# Patient Record
Sex: FEMALE | URBAN - METROPOLITAN AREA
[De-identification: names, ages, dates, MRNs, and addresses within clinical notes are randomized per-mention and may not be internally consistent; named-entity substitution may affect disease eponyms.]

---

## 2022-01-05 ENCOUNTER — HOSPITAL ENCOUNTER (OUTPATIENT)
Dept: PHYSICAL THERAPY | Age: 32
Discharge: HOME | End: 2022-01-05
Payer: COMMERCIAL

## 2022-01-05 PROCEDURE — 97161 PT EVAL LOW COMPLEX 20 MIN: CPT

## 2022-01-05 PROCEDURE — 97112 NEUROMUSCULAR REEDUCATION: CPT

## 2022-01-05 PROCEDURE — 97530 THERAPEUTIC ACTIVITIES: CPT

## 2022-01-05 NOTE — PROGRESS NOTES
[facility] [employer] [facility]  [address] [place] [address]  [phone] fax    Plan of Care/ Statement of Necessity for Physical Therapy Services    Patient name: Alejandra Contreras Start of Care: 2022   Referral source: Toña Jackson MD : 1990    Medical Diagnosis: Pelvic floor dysfunction [M62.89]  Payor: Laura Naylor / Plan: Ela Irving / Product Type: Commerical /  Onset Date:21    Treatment Diagnosis: PFD, bowel urgency, diastasis recti   Prior Hospitalization: see medical history Provider#: [id number]   Medications: Verified on Patient summary List    Comorbidities: asthma, 4th degree tear with childbirth   Prior Level of Function: ind with all mobility & house chores, horseback riding           The Plan of Care and following information is based on the information from the initial evaluation. Assessment/ mcgill information: Ms. Alejandra Contreras is a 29 y/o, F who present with c/o PFD, PF muscles weakness, bowel urgency and diastasis recti. Pt is 6 weeks post partum; first childbirth with 4th degree tear. Pt reports mod pressure/soreness with pelvic and lower abdominal region after prolonged activities, lifting, and walking. Pain is worst with Left side. She has significant difficulty delaying bowel urgency. Pt notes some difficulty with relaxation, min pressure and tensing of PF muscles during BMs. She denies any bowel leakage nor bladder problems. Pt hasn't been sexually active since childbirth, noted mod soreness with last internal assessment with OBGYN.  Evaluation reveals 3.5 finger diastasis recti, elevated tone of PF muscles, and mod-max pressure/soreness with palpation, worst with Left side. PERF score is 1/5/2//6 indicating fair strength and endurance of PF muscles. Also note mild instability of ant wall and min decreased mobility of perineal body.  Patient may benefit from physical therapy to address these limitations to improve her QOL.    Evaluation Complexity History MEDIUM  Complexity : 1-2 comorbidities / personal factors will impact the outcome/ POC ; Examination LOW Complexity : 1-2 Standardized tests and measures addressing body structure, function, activity limitation and / or participation in recreation  ;Presentation LOW Complexity : Stable, uncomplicated  ;Clinical Decision Making MEDIUM Complexity : FOTO score of 26-74  Overall Complexity Rating: LOW     Problem List: Pelvic pain/dysfunction, Decreased pelvic floor mm awareness, Decreased pelvic floor mm strength, Use of accessory muscles, Improper voiding habits and Hypertonus of pelvic floor    Treatment Plan may include any combination of the following:   Therapeutic exercise, Urge suppression techniques, Neuromuscular re-education, Manual therapy, Physical agent/modality and Patient education  Patient / Family readiness to learn indicated by: asking questions, trying to perform skills and interest    Persons(s) to be included in education: patient (P)    Barriers to Learning/Limitations: None    Patient Goal (s): strengthen pelvic floor and better bowel control    Patient Self Reported Health Status: excellent    Rehabilitation Potential: good    Short Term Goals: To be accomplished in 4  weeks:  1. Patient will demonstrate home exercise program accurately as adjunct to PT clinic visits to promote healthy lifestyle and increase quality of life. Status @ Eval: good understanding    2. Patient will report ability to utilize therawand with no or min pain to promote decrease of symptoms and increase quality of life. Status @ Eval: good understanding    3. Patient will report at least 25% improvement with pressure, pain and bowel urgency for more normal function and increased quality of life. Status @ Eval:1- 8/10, worst with prolonged standing/walking, activities, lifting, Left > right, mod-max difficulty with delaying fecal urgency     Long Term Goals:  To be accomplished in 8 weeks:  1. Patient will have FOTO score for PFDI Pain decreased by 5-8% points indicating improvement in function and report of no difficulty with maintaining intimate relations due to pain. Status @ Eval: 50    2. Patient will improve PF muscles strength at least 1 grade for increased quality of life and more normal function. Status @ Eval: 1/5    3. Patient will report at least 60% improvement with pressure, pain and bowel urgency for more normal function and increased quality of life. Status @ Eval: 1- 8/10, worst with prolonged standing/walking, activities, lifting, Left > right, mod-max difficulty with delaying fecal urgency    4. Patient will be independent with HEP at time of discharge to be able to continue with pelvic floor program.  Status @ Eval: good understanding     Frequency / Duration: Patient to be seen 1-2 times per week for 6 weeks. Patient/ Caregiver education and instruction: Diagnosis, prognosis, Proper Voiding Habits, Diet, Pain Management, Exercises and Bladder Retraining      Judith Goins Ruben 1/5/2022 9:31 AM    ________________________________________________________________________    I certify that the above Therapy Services are being furnished while the patient is under my care. I agree with the treatment plan and certify that this therapy is necessary.     [de-identified] Signature:____________Date:_________TIME:________     Yoon Washburn MD  ** Signature, Date and Time must be completed for valid certification **      Please sign and return to [facility] - [facility] [employer]   [address] [place] [address]  [phone] fax

## 2022-01-05 NOTE — PROGRESS NOTES
PF Daily Treatment Note  Patient Name: Shantal Trujillo  Date:2022  []  Patient  Verified  Insurance:Payor: Javy Mccoy / Plan: Guerline Morrow / Product Type: Commerical /   In time: 9:45  Out time:10:43  Total Treatment Time (min): 58  Total Timed Codes (min): 40  1:1 Treatment Time ( only): 62   Visit #: 1 of -    Treatment Area: [x] Pelvic Floor     [] Other:  SUBJECTIVE  Pain Level (0-10 scale): 1/10  Any medication changes, allergies to medications, adverse drug reactions, diagnosis change, or new procedure performed?: [x] No    [] Yes (see summary sheet for update)     Ms. Shantal Trujillo is a 27 y/o, F who present with c/o PFD, PF muscles weakness, bowel urgency and diastasis recti. Pt is 6 weeks post partum; first childbirth with 4th degree tear. Pt reports mod pressure/soreness with pelvic and lower abdominal region after prolonged activities, lifting, and walking. Pain is worst with Left side. She has significant difficulty delaying bowel urgency. Pt notes some difficulty with relaxation, min pressure and tensing of PF muscles during BMs. She denies any bowel leakage nor bladder problems. Pt hasn't been sexually active since childbirth, noted mod soreness with last internal assessment with OBGYN. Pelvic Floor Dysfunction Evaluation    Diastasis recti:    At umbilicus: 3.5 finger width   1cm sup to umbilicus: 3 finger width   1 cm inf to umbilicus: 1 finger width    Musculoskeletal Screen:    Skin Integrity:  [] Healthy [x] Red  [] Labia Atrophy [] Fragile    Sensation: [x] Intact [] Diminished:    Muscle Bulk: [x] Symmetrical  [] Well-developed [] Atrophied:  []L   []R   []B    Prolapse: [] Cystocele:   [] Rectocele:    PERF Score (Performance/Endurance/Repetitions/Flicks)   P: 1 E: 5 R: 2 F: 6 Total:    Patient has failed previous pelvic floor muscle training?   [] Yes    [] No    EMG Evaluation:  [] N/A [] Deferred secondary to:    Channel A: Electrode type:  [] Internal    [] Surface    [] Vaginal [] Rectal  Channel B: Electrode location:    Baseline Resting Tone (1 minute)  Channel A (microvolts): Quality:  [] Normal [] Irradic [] Elevated  Channel B (microvolts): Slow Twitch: (10 second hold, 20 second rest)  Channel A (microvolts): Quality:[] Quick/slow rise [] Low net rise  Net rise (microvolts):   [] Slow Relaxation [] Incoordination       [] Unable to contract [] Fatigues at (sec):       [] Elevated baseline between contractions    Channel B (microvolts): Use of Accessory Muscles: [] Minimal  Net rise (microvolts):   [] Moderate  [] Excessive       [] No use of accessory muscles    Fast Twitch (3 second hold, 10 second rest)  Channel A (microvolts): Quality:[] Quick/slow rise [] Low net rise  Net rise (microvolts):   [] Slow Relaxation [] Incoordination       [] Unable to contract [] Fatigues at (sec):       [] Elevated baseline between contractions    Channel B (microvolts): Use of Accessory Muscles: [] Minimal  Net rise (microvolts):   [] Moderate  [] Excessive       [] No use of accessory muscles    Optional Tests:  Lower abdominal strength: /5    Comments/Additional Tests:      OBJECTIVE  18 min eval    30 min Therapeutic Activity:  -  See flow sheet :Pt edu within scope of practice on prognosis, POC, PF muscles anatomy, modalities use, gentle mobilization and scar massage for perineal bodypositioning, healthy bowel and bladder function, diastasis recti management . Rationale: Increase pelvic floor muscle strength, Improve quality of pelvic floor contractions, Decrease resting tone of the pelvic floor, Increase tissue extensibility of the pelvic floor muscles, Increase core strength, Inhibit abnormal muscle activity and Improve lumbosacral and coccygeal mobility in order to Decrease bowel urgency, Improve frequency and ease of bowel movements, Improve ability to engage in sexual intercourse, Improve ability to undergo a gynecological exam and Improve ability to perform ADLs.        10 min Neuromuscular Re-education:  []  See flow sheet : quality PF muscles and core contraction   Rationale: Increase pelvic floor muscle strength, Improve quality of pelvic floor contractions, Decrease resting tone of the pelvic floor, Increase tissue extensibility of the pelvic floor muscles, Increase core strength, Inhibit abnormal muscle activity and Improve lumbosacral and coccygeal mobility in order to Decrease bowel urgency, Improve frequency and ease of bowel movements, Improve ability to engage in sexual intercourse, Improve ability to undergo a gynecological exam and Improve ability to perform ADLs.        min Patient Education: [x] Review HEP    [] Progressed/Changed HEP based on:   [] positioning   [] body mechanics   [] transfers   [] heat/ice application        Other Objective/Functional Measures:   []baseline resting tone:   []slow twitch mms   []fast twitch mms    Pain Level (0-10 scale) post treatment: 0/10    ASSESSMENT/Changes in Function: see POC please    []  Decrease # of leaks   [] No change []  Improving [] Resolved     []  Decrease hypertonus [] No change []  Improving [] Resolved     []  Increase void interval [] No change []  Improving [] Resolved     []  Increase PF strength [] No change []  Improving [] Resolved     []  Increase PF endurance [] No change []  Improving [] Resolved     []  Increase endurance [] No change []  Improving [] Resolved     []  Decrease # of pads [] No change []  Improving [] Resolved     []  Decrease pain [] No change []  Improving [] Resolved       Patient will continue to benefit from skilled PT services to modify and progress therapeutic interventions, address functional mobility deficits, address ROM deficits, address strength deficits, analyze and address soft tissue restrictions, analyze and cue movement patterns, analyze and modify body mechanics/ergonomics, assess and modify postural abnormalities and instruct in home and community integration to attain remaining goals.      [x]  See Plan of Care         PLAN  [x]  Upgrade activities as tolerated     [x]  Continue plan of care  []  Update interventions per flow sheet       []  Discharge due to:_  []  Other:_      Gerhardt Sawyer 1/5/2022  9:30 AM

## 2022-01-12 ENCOUNTER — HOSPITAL ENCOUNTER (OUTPATIENT)
Dept: PHYSICAL THERAPY | Age: 32
End: 2022-01-12
Payer: COMMERCIAL

## 2022-01-19 ENCOUNTER — APPOINTMENT (OUTPATIENT)
Dept: PHYSICAL THERAPY | Age: 32
End: 2022-01-19
Payer: COMMERCIAL

## 2022-01-26 ENCOUNTER — HOSPITAL ENCOUNTER (OUTPATIENT)
Dept: PHYSICAL THERAPY | Age: 32
Discharge: HOME | End: 2022-01-26
Payer: COMMERCIAL

## 2022-01-26 PROCEDURE — 97530 THERAPEUTIC ACTIVITIES: CPT

## 2022-01-26 PROCEDURE — 97112 NEUROMUSCULAR REEDUCATION: CPT

## 2022-01-26 NOTE — PROGRESS NOTES
PF DAILY TREATMENT NOTE 3-16    Patient Name: Mara Hernandez  Date:2022  : 1990  [x]  Patient  Verified  Payor: Brandon Rodriguez / Plan: Jayla  / Product Type: Commerical /    In time: 2:15   Out time:3:14  Total Treatment Time (min): 61  Visit #: 2 of 16    Treatment Area: [x] Pelvic Floor     [] Other:    SUBJECTIVE  Pain Level (0-10 scale): 0/10  Any medication changes, allergies to medications, adverse drug reactions, diagnosis change, or new procedure performed?: [x] No    [] Yes (see summary sheet for update)  Subjective functional status/changes:   [] No changes reported  Pt reports having a lot of pressure/prolapse with coughing/sneezing. She has no problem with exercises. She notes a lot of tightness but not as painful with sexual relations. Pt reports min bleeding with BM, she can feel her perineal body stays really tight. She has some soreness with the labia and 1st layers of PFM. No referring pain of PFM to abdominal region; her Left abdominal region still hurts with crunching activities, bending over activities and lifting. OBJECTIVE    30 min Therapeutic Activity:  []  See flow sheet :    []  Increase Tissue extensibility        [x]  Assess fiber intake    [x]  Assess voiding habits  [x]  Assess bowel habits  [x]  Other: therawand use, core HEP progression, answered all questions on manual techniques   Rationale: Increase pelvic floor muscle strength, Improve quality of pelvic floor contractions, Decrease resting tone of the pelvic floor, Increase tissue extensibility of the pelvic floor muscles, Increase core strength, Inhibit abnormal muscle activity and Improve lumbosacral and coccygeal mobility in order to Decrease bowel urgency, Improve ability to engage in sexual intercourse and Improve ability to perform ADLs.       29 min Neuromuscular Re-education:  []  See flow sheet :   [x]  Pelvic floor strengthening                 []  Pelvic floor downtraining  [x]  Quality pelvic floor contractions       [x]  Relaxation techniques  []  Urge suppression exercises  [x]  Other: appropriate voiding  Rationale: Increase pelvic floor muscle strength, Improve quality of pelvic floor contractions, Decrease resting tone of the pelvic floor, Increase tissue extensibility of the pelvic floor muscles, Increase core strength, Inhibit abnormal muscle activity and Improve lumbosacral and coccygeal mobility in order to Decrease bowel urgency, Improve ability to engage in sexual intercourse and Improve ability to perform ADLs. With   [] TE   [] TA   [] neuro  [] manual   [] other: Patient Education: [x] Review HEP    [] Progressed/Changed HEP based on:   [] positioning   [] body mechanics   [] transfers   [] heat/ice application    [] other:      Other Objective/Functional Measures:   []baseline resting tone: 6.5 µV  []slow twitch mms 10 sec hold, 10 sec rest, 7 rep: work: 24.4 µV, rest: 8.4 µV  []fast twitch mms   Good coordination with stretching/relaxation, baseline resting tone improves to 4.5 µV at the end of PT session    Pain Level (0-10 scale) post treatment: 0/10    ASSESSMENT/Changes in Function: Pt present with elevated tone but good improvement of tone and coordination for relaxation/gentle bulging after education. She reports good compliance with HEP and therawand use. Will progress strengthening therex for core and deep hips as tolerated; review down training and manual for PFM next visit.      []  Decrease # of leaks   [] No change []  Improving [] Resolved     []  Decrease hypertonus [] No change []  Improving [] Resolved     []  Increase void interval [] No change []  Improving [] Resolved     []  Increase PF strength [] No change []  Improving [] Resolved     []  Increase PF endurance [] No change []  Improving [] Resolved     []  Increase endurance [] No change []  Improving [] Resolved     []  Decrease # of pads [] No change []  Improving [] Resolved     []  Decrease pain [] No change []  Improving [] Resolved     []  Increased coordination [] No change []  Improving [] Resolved     []  Increased Bowel Frequency [] No change []  Improving [] Resolved       Patient will continue to benefit from skilled PT services to modify and progress therapeutic interventions, address functional mobility deficits, address ROM deficits, address strength deficits, analyze and address soft tissue restrictions, analyze and cue movement patterns, analyze and modify body mechanics/ergonomics, assess and modify postural abnormalities, address imbalance/dizziness and instruct in home and community integration to attain remaining goals. [x]  See Plan of Care  []  See progress note/recertification  []  See Discharge Summary         Progress towards goals / Updated goals:  Short Term Goals: To be accomplished in 4  weeks:  1. Patient will demonstrate home exercise program accurately as adjunct to PT clinic visits to promote healthy lifestyle and increase quality of life. Status @ Eval: good understanding  Current: good compliance 1-26-22     2. Patient will report ability to utilize therawand with no or min pain to promote decrease of symptoms and increase quality of life. Status @ Eval: good understanding  Current: good compliance 1-26-22     3. Patient will report at least 25% improvement with pressure, pain and bowel urgency for more normal function and increased quality of life. Status @ Eval:1- 8/10, worst with prolonged standing/walking, activities, lifting, Left > right, mod-max difficulty with delaying fecal urgency     Long Term Goals: To be accomplished in 8 weeks:  1. Patient will have FOTO score for PFDI Pain decreased by 5-8% points indicating improvement in function and report of no difficulty with maintaining intimate relations due to pain. Status @ Eval: 50     2. Patient will improve PF muscles strength at least 1 grade for increased quality of life and more normal function.    Status @ Eval: 1/5     3. Patient will report at least 60% improvement with pressure, pain and bowel urgency for more normal function and increased quality of life. Status @ Eval: 1- 8/10, worst with prolonged standing/walking, activities, lifting, Left > right, mod-max difficulty with delaying fecal urgency     4.  Patient will be independent with HEP at time of discharge to be able to continue with pelvic floor program.  Status @ Eval: good understanding     PLAN  [x]  Upgrade activities as tolerated     [x]  Continue plan of care  []  Update interventions per flow sheet       []  Discharge due to:_  []  Other:_      Stevenson Cantrell 1/26/2022  8:22 AM    Future Appointments   Date Time Provider Maura Larios   1/26/2022  2:15 PM Demarcus Leach [id number] [facility]   2/2/2022  2:15 PM Demarcus Leach [id number] [facility]   2/9/2022  2:15 PM Demarcus Leach [id number] [facility]

## 2022-02-02 ENCOUNTER — HOSPITAL ENCOUNTER (OUTPATIENT)
Dept: PHYSICAL THERAPY | Age: 32
Discharge: HOME | End: 2022-02-02
Payer: COMMERCIAL

## 2022-02-02 PROCEDURE — 97112 NEUROMUSCULAR REEDUCATION: CPT

## 2022-02-02 PROCEDURE — 97140 MANUAL THERAPY 1/> REGIONS: CPT

## 2022-02-02 PROCEDURE — 97110 THERAPEUTIC EXERCISES: CPT

## 2022-02-02 NOTE — PROGRESS NOTES
[facility] [employer] [facility]  [address] [place] [address]  [phone] fax    Pelvic Floor Progress Note    Patient name: Dwayne Benitez Start of Care: 2022   Referral source: Alissa Miller MD : 1990               Medical Diagnosis: Pelvic floor dysfunction [M62.89]  Payor: Tomas Davis / Plan: Candis Haywood / Product Type: Commerical /  Onset Date:21               Treatment Diagnosis: PFD, bowel urgency, diastasis recti   Prior Hospitalization: see medical history Provider#: [id number]   Medications: Verified on Patient summary List    Comorbidities: asthma, 4th degree tear with childbirth   Prior Level of Function: ind with all mobility & house chores, horseback riding                                                                               Visits from Start of Care: 3    Missed Visits: 0    Established Goals:           Excellent Good         Limited           None  [x] Increase Pelvic MM strength []  []  [x]  []  [x] Decrease Pelvic MM hypertonus []  []  [x]  []  [] Decrease Incontinence Episodes []  []  []  []   [] Improve Voiding Habits  []  []  []  []  [x] Decreased Urgency   []  [x]  []  []    Key Functional Changes: improving pain/pressure with sexual relations, improving bowel urgency sensation, improving core strength    Updated Goals: to be achieved in 4 weeks:  1. Patient will have FOTO score for PFDI Pain decreased by 5-8% points indicating improvement in function and report of no difficulty with maintaining intimate relations due to pain. Status at Progress Note: 17     2. Patient will improve PF muscles strength at least 1 grade for increased quality of life and more normal function. Status at Progress Note: ; progressing slowly 2-2-     3. Patient will report at least 60% improvement with pressure, pain and bowel urgency for more normal function and increased quality of life.   Status at Progress Note[de-identified] 1- 8/10, worst with prolonged standing/walking, activities, lifting, Left > right, mod-max difficulty with delaying fecal urgency;progressing well 50% improvement with pain/pressure 2-2-22     4. Patient will be independent with HEP at time of discharge to be able to continue with pelvic floor program.  Status at Progress Note: good compliance 2-2-22    ASSESSMENT/RECOMMENDATIONS: Pt's making good progress with PT, reports 50% improvement with overall pain/pressure, no pain with sexual relations and bowel function is WNL. Diastasis recti improved gradually, 2.75 finger width at umbilicus). She present with hips flexor tightness, significant elevation of PFM's tone thus limiting strength and tenderness along Left abdominal region. Pt would cont to benefit from skilled PF PT to Increase pelvic floor muscle strength, Improve quality of pelvic floor contractions, Decrease resting tone of the pelvic floor, Increase tissue extensibility of the pelvic floor muscles, Increase core strength, Inhibit abnormal muscle activity and Improve lumbosacral and coccygeal mobility in order to Improve ability to engage in sexual intercourse, Improve ability to undergo a gynecological exam and Improve ability to perform ADLs.        [x]Continue therapy per initial plan/protocol at a frequency of  1-2 x per week for 4 weeks  []Continue therapy with the following recommended changes:_____________________      _____________________________________________________________________  []Discontinue therapy progressing towards or have reached established goals  []Discontinue therapy due to lack of appreciable progress towards goals  []Discontinue therapy due to lack of attendance or compliance  []Await Physician's recommendations/decisions regarding therapy  []Other:________________________________________________________________    Thank you for this referral.   Fransisco Cummings 2/2/2022 2:35 PM  NOTE TO PHYSICIAN:  PLEASE COMPLETE THE ORDERS BELOW AND   FAX TO [facility]: [phone]  If you are unable to process this request in 24 hours please contact our office: [phone]    ? I have read the above report and request that my patient continue as recommended. ? I have read the above report and request that my patient continue therapy with the following changes/special instructions:___________________________________________________________  ? I have read the above report and request that my patient be discharged from therapy.     Physician's Signature:____________Date:_________TIME:________     Yessy Rose MD  ** Signature, Date and Time must be completed for valid certification **

## 2022-02-02 NOTE — PROGRESS NOTES
PF DAILY TREATMENT NOTE 3-16    Patient Name: Katya Dimas  Date:2022  : 1990  [x]  Patient  Verified  Payor: Genet Castillo / Plan: Delta Cornea / Product Type: Commerical /    In time:2:17  Out time: 3:01  Total Treatment Time (min): 44  Visit #: 3 of 16    Treatment Area: [x] Pelvic Floor     [] Other:    SUBJECTIVE  Pain Level (0-10 scale): 0/10  Any medication changes, allergies to medications, adverse drug reactions, diagnosis change, or new procedure performed?: [x] No    [] Yes (see summary sheet for update)  Subjective functional status/changes:   [] No changes reported  Pt cont to note min pressure/tension of Left abdominal muscles; bowel is WNL. OBJECTIVE      10 min Therapeutic Exercise:  [x] See flow sheet :   []  Pelvic floor strengthening                 [x]  Pelvic floor downtraining  []  Quality pelvic floor contractions       [x]  Relaxation techniques  []  Urge suppression exercises  []  Other:  Rationale: Increase pelvic floor muscle strength, Improve quality of pelvic floor contractions, Decrease resting tone of the pelvic floor, Increase tissue extensibility of the pelvic floor muscles, Increase core strength, Inhibit abnormal muscle activity and Improve lumbosacral and coccygeal mobility in order to Improve ability to engage in sexual intercourse, Improve ability to undergo a gynecological exam and Improve ability to perform ADLs.        24 min Neuromuscular Re-education:  [x]  See flow sheet :   []  Pelvic floor strengthening                 [x]  Pelvic floor downtraining  []  Quality pelvic floor contractions       [x]  Relaxation techniques  []  Urge suppression exercises  [x]  Other: core & hips strengthening  Rationale:  Increase pelvic floor muscle strength, Improve quality of pelvic floor contractions, Decrease resting tone of the pelvic floor, Increase tissue extensibility of the pelvic floor muscles, Increase core strength, Inhibit abnormal muscle activity and Improve lumbosacral and coccygeal mobility in order to Improve ability to engage in sexual intercourse, Improve ability to undergo a gynecological exam and Improve ability to perform ADLs. 10 min Manual Therapy:  Abdominal MFR, skin roll for rectus abdominus muscles   The manual therapy interventions were performed at a separate and distinct time from the therapeutic activities interventions. Rationale: Decrease resting tone of the pelvic floor, Increase tissue extensibility of the pelvic floor muscles, Inhibit abnormal muscle activity and Improve lumbosacral and coccygeal mobility in order to Decrease bowel urgency, Improve ability to engage in sexual intercourse and Improve ability to perform ADLs.           With   [] TE   [] TA   [] neuro  [] manual   [] other: Patient Education: [x] Review HEP    [] Progressed/Changed HEP based on:   [] positioning   [] body mechanics   [] transfers   [] heat/ice application    [] other:      Other Objective/Functional Measures:   []baseline resting tone:   []slow twitch mms   []fast twitch mms    Pain Level (0-10 scale) post treatment: 0/10    ASSESSMENT/Changes in Function: see Progress Note please      []  Decrease # of leaks   [] No change []  Improving [] Resolved     []  Decrease hypertonus [] No change []  Improving [] Resolved     []  Increase void interval [] No change []  Improving [] Resolved     []  Increase PF strength [] No change []  Improving [] Resolved     []  Increase PF endurance [] No change []  Improving [] Resolved     []  Increase endurance [] No change []  Improving [] Resolved     []  Decrease # of pads [] No change []  Improving [] Resolved     []  Decrease pain [] No change []  Improving [] Resolved     []  Increased coordination [] No change []  Improving [] Resolved     []  Increased Bowel Frequency [] No change []  Improving [] Resolved       Patient will continue to benefit from skilled PT services to modify and progress therapeutic interventions, address functional mobility deficits, address ROM deficits, address strength deficits, analyze and address soft tissue restrictions, analyze and cue movement patterns, analyze and modify body mechanics/ergonomics, assess and modify postural abnormalities and instruct in home and community integration to attain remaining goals. [x]  See Plan of Care  []  See progress note/recertification  []  See Discharge Summary         Progress towards goals / Updated goals:  Short Term Goals: To be accomplished in 4  weeks:  1. Patient will demonstrate home exercise program accurately as adjunct to PT clinic visits to promote healthy lifestyle and increase quality of life. Status @ Eval: good understanding  Current: good compliance 1-26-22     2. Patient will report ability to utilize therawand with no or min pain to promote decrease of symptoms and increase quality of life. Status @ Eval: good understanding  Current: good compliance 1-26-22     3. Patient will report at least 25% improvement with pressure, pain and bowel urgency for more normal function and increased quality of life. Status @ Eval:1- 8/10, worst with prolonged standing/walking, activities, lifting, Left > right, mod-max difficulty with delaying fecal urgency  Current: met 50% improvement 2-2-22     Long Term Goals: To be accomplished in 8 weeks:  1. Patient will have FOTO score for PFDI Pain decreased by 5-8% points indicating improvement in function and report of no difficulty with maintaining intimate relations due to pain. Status @ Eval: 50  Current: met improved 66% 2-2-22     2. Patient will improve PF muscles strength at least 1 grade for increased quality of life and more normal function. Status @ Eval: 1/5  Current: progressing slowly 2-2-22     3. Patient will report at least 60% improvement with pressure, pain and bowel urgency for more normal function and increased quality of life.   Status @ Eval: 1- 8/10, worst with prolonged standing/walking, activities, lifting, Left > right, mod-max difficulty with delaying fecal urgency  Current: progressing well 50% improvement with pain/pressure 2-2-22     4.  Patient will be independent with HEP at time of discharge to be able to continue with pelvic floor program.  Status @ Eval: good understanding   Current: good compliance 2-2-22    PLAN  [x]  Upgrade activities as tolerated     [x]  Continue plan of care  []  Update interventions per flow sheet       []  Discharge due to:_  []  Other:_      Kedar Figueroa 2/2/2022  8:11 AM    Future Appointments   Date Time Provider Maura Larios   2/2/2022  2:15 PM Radha Mccray [id number] [facility]   2/9/2022  2:15 PM Radha Mccray [id number] [facility]

## 2022-02-09 ENCOUNTER — HOSPITAL ENCOUNTER (OUTPATIENT)
Dept: PHYSICAL THERAPY | Age: 32
End: 2022-02-09
Payer: COMMERCIAL

## 2022-02-22 NOTE — PROGRESS NOTES
[facility] - [facility]  [address]  [phone] fax    Physical Therapy Discharge Summary       Patient name: Isidra [facility] - INPATIENT of Care: 1/5/2022   Referral source: Edison Stevens MD PTI: [id number]               Medical Diagnosis: Pelvic floor dysfunction [M62.89]  Payor: Antonio Thomson / Plan: Cristina Salgado / Product Type: Commerical /  Onset Date:11-21-21               Treatment Diagnosis: PFD, bowel urgency, diastasis recti   Prior Hospitalization: see medical history Provider#: [id number]   Medications: Verified on Patient summary List    Comorbidities: asthma, 4th degree tear with childbirth   Prior Level of Function: ind with all mobility & house chores, horseback riding                                                                                Visits from Start of Care: 3                                      Missed Visits: 0     Reporting Period : 2-2-2022    Summary of Care:  Updated Goals: to be achieved in 4 weeks:  1. Patient will have FOTO score for PFDI Pain decreased by 5-8% points indicating improvement in function and report of no difficulty with maintaining intimate relations due to pain. Status at Progress Note: 17  Status at discharge: progressing well      2. Patient will improve PF muscles strength at least 1 grade for increased quality of life and more normal function. Status at Progress Note: 1/5; progressing slowly 2-2-22  Status at discharge: met     3. Patient will report at least 60% improvement with pressure, pain and bowel urgency for more normal function and increased quality of life. Status at Progress Note[de-identified] 1- 8/10, worst with prolonged standing/walking, activities, lifting, Left > right, mod-max difficulty with delaying fecal urgency;progressing well 50% improvement with pain/pressure 2-2-22  Status at discharge: met     4.  Patient will be independent with HEP at time of discharge to be able to continue with pelvic floor program.  Status at Progress Note: good compliance 2-2-22  Status at discharge: met      ASSESSMENT/RECOMMENDATIONS: Pt's been making good progress with PT. Pt reports about 60-70% improvement overall and confidence with HEP/self management. No new complaints/questions after 2 weeks of holding PT for self monitoring. Discharged at this time as skilled PT is no longer medically neccessary.        [x]Discontinue therapy: [x]Patient has reached or is progressing toward set goals      []Patient is non-compliant or has abdicated      []Due to lack of appreciable progress towards set goals    Judith Lopez 2/22/2022 3:25 PM